# Patient Record
Sex: FEMALE | Race: OTHER | Employment: FULL TIME | URBAN - METROPOLITAN AREA
[De-identification: names, ages, dates, MRNs, and addresses within clinical notes are randomized per-mention and may not be internally consistent; named-entity substitution may affect disease eponyms.]

---

## 2019-11-12 ENCOUNTER — APPOINTMENT (OUTPATIENT)
Dept: GENERAL RADIOLOGY | Age: 49
End: 2019-11-12
Attending: PHYSICIAN ASSISTANT
Payer: SELF-PAY

## 2019-11-12 ENCOUNTER — APPOINTMENT (OUTPATIENT)
Dept: CT IMAGING | Age: 49
End: 2019-11-12
Attending: PHYSICIAN ASSISTANT
Payer: SELF-PAY

## 2019-11-12 ENCOUNTER — HOSPITAL ENCOUNTER (EMERGENCY)
Age: 49
Discharge: HOME | End: 2019-11-12
Attending: EMERGENCY MEDICINE | Admitting: EMERGENCY MEDICINE
Payer: SELF-PAY

## 2019-11-12 DIAGNOSIS — R07.89 ATYPICAL CHEST PAIN: ICD-10-CM

## 2019-11-12 DIAGNOSIS — S09.90XA CLOSED HEAD INJURY, INITIAL ENCOUNTER: Primary | ICD-10-CM

## 2019-11-12 LAB — TROPONIN I BLD-MCNC: <0.04 NG/ML (ref 0–0.08)

## 2019-11-12 PROCEDURE — 72050 X-RAY EXAM NECK SPINE 4/5VWS: CPT

## 2019-11-12 PROCEDURE — 71046 X-RAY EXAM CHEST 2 VIEWS: CPT

## 2019-11-12 PROCEDURE — 93005 ELECTROCARDIOGRAM TRACING: CPT

## 2019-11-12 PROCEDURE — 74011250637 HC RX REV CODE- 250/637: Performed by: PHYSICIAN ASSISTANT

## 2019-11-12 PROCEDURE — 70450 CT HEAD/BRAIN W/O DYE: CPT

## 2019-11-12 PROCEDURE — 99284 EMERGENCY DEPT VISIT MOD MDM: CPT

## 2019-11-12 PROCEDURE — 84484 ASSAY OF TROPONIN QUANT: CPT

## 2019-11-12 RX ORDER — IBUPROFEN 600 MG/1
600 TABLET ORAL
Status: COMPLETED | OUTPATIENT
Start: 2019-11-12 | End: 2019-11-12

## 2019-11-12 RX ADMIN — IBUPROFEN 600 MG: 600 TABLET ORAL at 20:39

## 2019-11-12 NOTE — ED TRIAGE NOTES
Pt arrives with headache, neck pain and numbness to L side of face after having a \"pot\" fall on her head while she was working this past Friday. VSS.

## 2019-11-13 LAB
ATRIAL RATE: 57 BPM
CALCULATED P AXIS, ECG09: 86 DEGREES
CALCULATED R AXIS, ECG10: 52 DEGREES
CALCULATED T AXIS, ECG11: 27 DEGREES
DIAGNOSIS, 93000: NORMAL
P-R INTERVAL, ECG05: 146 MS
Q-T INTERVAL, ECG07: 406 MS
QRS DURATION, ECG06: 70 MS
QTC CALCULATION (BEZET), ECG08: 395 MS
VENTRICULAR RATE, ECG03: 57 BPM

## 2019-11-13 NOTE — ED PROVIDER NOTES
44 y.o. female with no significant past medical history who presents via private vehicle from home accompanied by family members with chief complaint of a head injury. Patient reports having a metal pot fall on top of her head while she was washing dishes 4 days ago with immediate pain to the top of her head and LOC. Patient now reports neck pain and mild swelling, redness, and numbness to the left side of her face. Patient reports some bilateral leg weakness with ambulation. No pain medications PTA. Patient is not anticoagulated. Patient reports some difficulty sleeping yesterday morning s/t \"throbbing\" head pain. No n/v. There are no other acute medical concerns at this time. Social hx: Never tobacco smoker; Denies EtOH use; Denies illicit drug use  PCP: None    Note written by Damián Lawrence, as dictated by Ilana Izquierdo PA-C 7:14 PM           History reviewed. No pertinent past medical history. Past Surgical History:   Procedure Laterality Date    HX OOPHORECTOMY  04/2018         History reviewed. No pertinent family history.     Social History     Socioeconomic History    Marital status: Not on file     Spouse name: Not on file    Number of children: Not on file    Years of education: Not on file    Highest education level: Not on file   Occupational History    Not on file   Social Needs    Financial resource strain: Not on file    Food insecurity:     Worry: Not on file     Inability: Not on file    Transportation needs:     Medical: Not on file     Non-medical: Not on file   Tobacco Use    Smoking status: Never Smoker    Smokeless tobacco: Never Used   Substance and Sexual Activity    Alcohol use: Never     Frequency: Never    Drug use: Not on file    Sexual activity: Not on file   Lifestyle    Physical activity:     Days per week: Not on file     Minutes per session: Not on file    Stress: Not on file   Relationships    Social connections:     Talks on phone: Not on file     Gets together: Not on file     Attends Baptism service: Not on file     Active member of club or organization: Not on file     Attends meetings of clubs or organizations: Not on file     Relationship status: Not on file    Intimate partner violence:     Fear of current or ex partner: Not on file     Emotionally abused: Not on file     Physically abused: Not on file     Forced sexual activity: Not on file   Other Topics Concern    Not on file   Social History Narrative    Not on file         ALLERGIES: Pcn [penicillins]    Review of Systems   Constitutional: Negative. Negative for chills, fatigue and fever. HENT: Negative. Negative for congestion, ear pain, facial swelling, rhinorrhea, sneezing and sore throat. Respiratory: Negative for cough and shortness of breath. Cardiovascular: Positive for chest pain. Gastrointestinal: Negative. Negative for abdominal pain, constipation, diarrhea, nausea and vomiting. Genitourinary: Negative for difficulty urinating, frequency and urgency. Musculoskeletal: Positive for neck pain. Neurological: Positive for dizziness, numbness (facial) and headaches. All other systems reviewed and are negative. There were no vitals filed for this visit. Physical Exam   Constitutional: She is oriented to person, place, and time. She appears well-developed and well-nourished. No distress. Well appearing  female in NAD   HENT:   Head: Normocephalic and atraumatic. Head is without abrasion, without contusion, without laceration, without right periorbital erythema and without left periorbital erythema. Hair is normal.   Right Ear: External ear normal. No hemotympanum. Left Ear: External ear normal. No hemotympanum. Nose: Nose normal.   Mouth/Throat: Uvula is midline, oropharynx is clear and moist and mucous membranes are normal. No oropharyngeal exudate. Eyes: Pupils are equal, round, and reactive to light.  Conjunctivae are normal. Neck: Normal range of motion. Neck supple. No spinous process tenderness and no muscular tenderness present. No neck rigidity. No edema and no erythema present. Cardiovascular: Normal rate, regular rhythm and normal heart sounds. Pulmonary/Chest: Effort normal. No respiratory distress. She has no wheezes. Abdominal: Soft. Bowel sounds are normal. She exhibits no distension. There is no tenderness. There is no rebound. Musculoskeletal: Normal range of motion. Neurological: She is alert and oriented to person, place, and time. She exhibits normal muscle tone. Coordination normal.   Skin: Skin is warm and dry. No ecchymosis, no laceration and no lesion noted. Psychiatric: She has a normal mood and affect. Her behavior is normal.   Nursing note and vitals reviewed. MDM  Number of Diagnoses or Management Options  Atypical chest pain:   Closed head injury, initial encounter:   Diagnosis management comments: 45 yo  female with complaint of continued HA, neck pain with facial numbness following head injury last week. Non focal neuro exam. No e/o trauma on exam. Ct - for acute finding. Cervical xray negative. Tylenol and ice for pain. PCP follow-up discussed. [place], [place]         Amount and/or Complexity of Data Reviewed  Tests in the radiology section of CPT®: ordered and reviewed  Independent visualization of images, tracings, or specimens: yes           Procedures      Progress note    Patient's results have been reviewed with them. Patient and/or family have verbally conveyed their understanding and agreement of the patient's signs, symptoms, diagnosis, treatment and prognosis and additionally agree to follow up as recommended or return to the Emergency Room should their condition change prior to follow-up.   Discharge instructions have also been provided to the patient with some educational information regarding their diagnosis as well a list of reasons why they would want to return to the ER prior to their follow-up appointment should their condition change.  Shahrzad Hernandez, [address]

## 2023-02-25 NOTE — DISCHARGE INSTRUCTIONS
Patient Education        Dolor de pecho musculoesquelético: Instrucciones de cuidado - [ Musculoskeletal Chest Pain: Care Instructions ]  Instrucciones de cuidado    El dolor de pecho no siempre es denis señal de que haya algo jarad con gomez corazón, o de que tenga algún otro problema grave de mesha. Gomez médico piensa que gomez dolor de pecho es causado por músculos o ligamentos forzados, inflamación del cartílago del pecho, o algún otro problema en el pecho y no en el corazón. Es posible que necesite más pruebas para determinar la causa del dolor de [place]. La atención de seguimiento es denis parte clave de gomez tratamiento y seguridad. Asegúrese de hacer y acudir a todas las citas, y llame a gomez médico si está teniendo problemas. También es denis buena idea saber los resultados de destiney exámenes y mantener denis lista de los medicamentos que blu. ¿Cómo puede cuidarse en el hogar? · [employer] analgésicos (medicamentos para el dolor) exactamente pham le fueron indicados. ? Si el médico le recetó un analgésico, tómelo según las indicaciones. ? Si no está tomando un analgésico recetado, pregúntele a gomez médico si puede beulah alexsandra de [place]. · Descanse y proteja la migel adolorida. · Interrumpa, modifique o suspenda cualquier actividad que pudiera estar causándole el dolor. · Colóquese hielo o denis compresa fría en la migel adolorida austin 10 a 20 minutos cada vez. Trate de hacerlo cada 1 a 2 horas austin los siguientes 3 días (cuando esté despierto) o hasta que la hinchazón baje. Póngase un paño rankin entre el hielo y la piel. · Después de 2 ó 3 días, aplíquese denis toalla tibia o denis almohadilla térmica a baja temperatura en la migel adolorida. Algunos médicos sugieren que se alterne entre tratamientos con calor y frío. · No se envuelva ni se vende con cinta las costillas para sostenerlas. Kief podría hacer que usted kamar respiraciones más cortas, lo que podría aumentar gomez riesgo de [employer].   · Mary Jensen mentoladas, pham [address] o [address], podrían aliviar los músculos adoloridos. Siga las instrucciones del envase. · Siga las instrucciones de gomez médico sobre el ejercicio. · El estiramiento y el masaje suaves podrían ayudarle a mejorarse más rápidamente. Estírese despacio hasta el punto antes de que comience el dolor y mantenga el estiramiento austin al menos 15 a 30 segundos. Dacia esto 3 ó 4 veces al día. Dacia estiramientos después de haberse aplicado calor. · A medida que gomez dolor mejore, vuelva poco a poco a destiney actividades normales. Si el dolor Nelly, podría ser denis señal de que necesita descansar aust[place]. ¿Cuándo debe pedir ayuda? Llame al 911 en cualquier momento que considere que necesita atención de emergencia. Por ejemplo, llame si:    · Siente dolor u opresión en el pecho. Estos síntomas podrían estar acompañados de:  ? Sudoración. ? Falta de aire. ? Náuseas o vómito. ? Dolor que se extiende del pecho al giovanni, la Elle, o hacia alexsandra o ambos hombros o [id number] ? Annette Le[place]. ? Pulso rápido o irregular. Después de llamar al  911, mastique 1 aspirina para adultos. Espere denis ambulancia. No trate de conducir usted mismo un automóvil.     · Tiene dolor repentino en el pecho y falta de aire, o tose kelli.    Llame a gomez médico ahora mismo o busque atención médica inmediata si:    · Tiene cualquier dificultad para respirar.     · El dolor en el pecho empeora.     · Gomez dolor de pecho aparece constantemente con el ejercicio y se huseyin con el reposo.    Preste especial atención a los cambios en gomez mesha y asegúrese de comunicarse con gomez médico si:    · Gomez dolor de pecho no mejora después de 1 semana. ¿Dónde puede encontrar más información en inglés? Alice riddle [email or web address]   Gisele Kaplan [id number] en la búsqueda para aprender más acerca de \"Dolor de pecho musculoesquelético: Instrucciones de cuidado - [ Musculoskeletal Chest Pain: Care Instructions ].\"  Revisado: 26 junio, 2019  Versión del contenido: 12.2  © 1444-8081 Healthwise, Incorporated. Las instrucciones de cuidado fueron adaptadas bajo licencia por [employer] (which disclaims liability or warranty for this information). Si usted tiene [place] afección médica o sobre estas instrucciones, siempre pregunte a kraft profesional de mesha. Healthwise, Incorporated niega toda garantía o responsabilidad por kraft uso de esta información. Patient Education        Aprenda sobre denis lesión cerrada en la [place] - [ Becky Quintero About a Closed Head Injury ]  ¿Qué es denis lesión cerrada en [place]? Denis lesión cerrada en la ele ocurre cuando la ele recibe un golpe teresita. La intensa fuerza del golpe hace que el cerebro se sacuda dentro del cráneo. Kiana movimiento puede hacer que el cerebro se magulle, se hinche o se desgarre. A veces, los nervios o los vasos sanguíneos también se dañan. [place] puede provocar sangrado dentro del cerebro o alrededor de él. Isidor Melba conmoción cerebral es un tipo de lesión cerrada en la ele. ¿Cuáles son los síntomas? Si usted tiene denis conmoción leve, podría tener un ligero dolor de ele o sentirse \"no del todo wilfredo\". Estos síntomas son comunes. Suelen desaparecer entre unos pocos días y 4 semanas después. Abiodun, a veces, es posible que después de denis conmoción cerebral usted se sienta pham si no pudiera funcionar montalvo wilfredo pham antes de la lesión. Y tiene nuevos síntomas. [place] se llama síndrome posconmocional. Usted puede:  · Tener denis mayor dificultad para resolver problemas, pensar, concentrarse o recordar. · [employer] de Jun[place]. · Tener cambios en destiney patrones de sueño, pham no poder dormir o dormir todo el tiempo. · Tener cambios de personalidad. · No tener interés en las actividades habituales. · Sentirse enojado o ansioso sin motivo german. · Perder el sentido del gusto o del olfato.   · Estar mareado, aturdido o con dificultades para mantener el equilibrio. Podría resultarle difícil estar de pie o caminar. ¿Cómo se trata denis lesión cerrada en la ele? Cualquier persona que pueda tener denis conmoción cerebral necesita christopher a un médico. Algunas personas tienen que permanecer en el hospital para estar en observación. Otras pueden volver a kraft hogar de manera messina. Si usted vuelve a kraft hogar, siga las instrucciones de kraft médico. Él o zayra le dirá si necesita que alguien lo vigile atentamente austin las siguientes 24 horas o más [place]. El descanso es el mejor tratamiento. Duerma lo suficiente por la noche. Y trate de descansar austin el día. · Evite las actividades que requieran un esfuerzo físico o mental. Estas incluyen las tareas domésticas y escolares, así pham el ejercicio. Y evite jugar videojuegos, enviar mensajes de texto o usar la computadora. Es posible que deba cambiar kraft horario escolar o laboral para poder evitar estas actividades. · Pregúntele a kraft médico cuándo va a poder conducir, montar en bicicleta u operar maquinaria. · [place] un analgésico (medicamento para el dolor) de venta zulma, pham acetaminofén (Tylenol), ibuprofeno (Advil, Motrin) o naproxeno (Aleve). Sea felicia con los medicamentos. Kita y siga todas las instrucciones de la [place]. · Consulte a kraft médico antes de beulah cualquier otro medicamento para el dolor. · No mahendra alcohol ni consuma drogas ilegales. Pueden retrasar la sanación. También pueden aumentar el riesgo de sufrir denis segunda lesión en la ele. La atención de seguimiento es denis parte clave de kraft tratamiento y seguridad. Asegúrese de hacer y acudir a todas las citas, y llame a kraft médico si está teniendo problemas. También es denis buena idea saber los resultados de destiney exámenes y mantener denis lista de los medicamentos que blu. ¿Dónde puede encontrar más información en inglés? Dimas Read a [email or web address]-[email or web address]   Escriba [id number] en la búsqueda para aprender más acerca de \"Aprenda sobre denis lesión cerrada en la ele - [ Learning About a Closed Head Injury ]. \"  Revisado: 28 marzo, 2019  Versión del contenido: 12.2  © 0159-2158 [employer]. Las instrucciones de cuidado fueron adaptadas bajo licencia por [employer] (which disclaims liability or warranty for this information). Si usted tiene [place] afección médica o sobre estas instrucciones, siempre pregunte a kraft profesional de mesha. [employer], [employer] niega toda garantía o responsabilidad por kraft uso de esta información. [place] instructions